# Patient Record
(demographics unavailable — no encounter records)

---

## 2025-01-03 NOTE — PHYSICAL EXAM
[No Acute Distress] : no acute distress [Well Nourished] : well nourished [Well Developed] : well developed [Well-Appearing] : well-appearing [Normal Voice/Communication] : normal voice/communication [Normal Sclera/Conjunctiva] : normal sclera/conjunctiva [PERRL] : pupils equal round and reactive to light [EOMI] : extraocular movements intact [Normal Outer Ear/Nose] : the outer ears and nose were normal in appearance [Normal Oropharynx] : the oropharynx was normal [Normal TMs] : both tympanic membranes were normal [No JVD] : no jugular venous distention [No Lymphadenopathy] : no lymphadenopathy [Supple] : supple [Thyroid Normal, No Nodules] : the thyroid was normal and there were no nodules present [No Respiratory Distress] : no respiratory distress  [No Accessory Muscle Use] : no accessory muscle use [Clear to Auscultation] : lungs were clear to auscultation bilaterally [Normal Rate] : normal rate  [Regular Rhythm] : with a regular rhythm [Normal S1, S2] : normal S1 and S2 [No Murmur] : no murmur heard [No Carotid Bruits] : no carotid bruits [No Abdominal Bruit] : a ~M bruit was not heard ~T in the abdomen [No Varicosities] : no varicosities [Pedal Pulses Present] : the pedal pulses are present [No Edema] : there was no peripheral edema [No Palpable Aorta] : no palpable aorta [No Extremity Clubbing/Cyanosis] : no extremity clubbing/cyanosis [Soft] : abdomen soft [Non Tender] : non-tender [Non-distended] : non-distended [No Masses] : no abdominal mass palpated [No HSM] : no HSM [Normal Bowel Sounds] : normal bowel sounds [No CVA Tenderness] : no CVA  tenderness [No Spinal Tenderness] : no spinal tenderness [No Joint Swelling] : no joint swelling [Grossly Normal Strength/Tone] : grossly normal strength/tone [No Rash] : no rash [Coordination Grossly Intact] : coordination grossly intact [No Focal Deficits] : no focal deficits [Normal Gait] : normal gait [Deep Tendon Reflexes (DTR)] : deep tendon reflexes were 2+ and symmetric [Speech Grossly Normal] : speech grossly normal [Memory Grossly Normal] : memory grossly normal [Normal Affect] : the affect was normal [Alert and Oriented x3] : oriented to person, place, and time [Normal Mood] : the mood was normal [Normal Insight/Judgement] : insight and judgment were intact [Comprehensive Foot Exam Normal] : Right and left foot were examined and both feet are normal. No ulcers in either foot. Toes are normal and with full ROM.  Normal tactile sensation with monofilament testing throughout both feet

## 2025-01-03 NOTE — REVIEW OF SYSTEMS
[Joint Pain] : joint pain [Joint Stiffness] : joint stiffness [Negative] : Heme/Lymph [Muscle Pain] : no muscle pain [Muscle Weakness] : no muscle weakness [Back Pain] : no back pain [Joint Swelling] : no joint swelling [Headache] : no headache [Dizziness] : no dizziness [Fainting] : no fainting [Confusion] : no confusion [Unsteady Walk] : no ataxia [Memory Loss] : no memory loss [FreeTextEntry7] : lump left groin. [FreeTextEntry9] : Back pain resolved postop. [de-identified] : Paresthesia of lower extremities improving postop.

## 2025-01-03 NOTE — ASSESSMENT
[FreeTextEntry1] : .  Assessment and plan:  1.  Lumbar radiculopathy secondary to lumbar stenosis patient recently underwent spinal surgery at [facility] he states that the pain has totally resolved but he does have some residual lower extremity paresthesia other than that he is feeling great postop.  2.  Hypertension well-controlled with present medical management patient will continue metoprolol succinate ER 50 mg p.o. daily and lisinopril 40 mg p.o. daily.  Today's blood pressure stable for patient at 140/78.  3.  Hyperlipidemia continue low-fat low-cholesterol diet and Zetia 10 mg p.o. daily with rosuvastatin 40 mg p.o. daily.  4.  Ischemic heart disease patient will continue aspirin 81 mg p.o. daily.  5.  Hyperglycemia at today's visit comprehensive blood work drawn I will be checking hemoglobin A1c.  Patient in the past was taking glimepiride 4 mg p.o. daily patient's [facility].  6.  Comprehensive blood work drawn in office by examiner    Total time spent face-to-face and non-face-to-face time 35 minutes.

## 2025-01-03 NOTE — HISTORY OF PRESENT ILLNESS
[FreeTextEntry1] : see HPI [de-identified] : Patient is 79-year-old gentleman who presents today for follow-up and disease management patient with lumbar radiculopathy was seen by orthopedic surgery spine specialist Dr. Tj Alvarez and also by pain management patient on his last epidural steroid injection it was successful for pain relief..  History is also significant for hypertension, diabetes mellitus type 2, hyperlipidemia, ischemic heart disease,.  Patient with history of lumbar stenosis with radiculopathy subsequently did undergo surgery at [facility] patient admits to feeling much better.  The procedure was done August 23 at [facility] procedure done by Dr. Yogesh Delgado.

## 2025-01-03 NOTE — HEALTH RISK ASSESSMENT
[Yes] : Yes [2 - 3 times a week (3 pts)] : 2 - 3  times a week (3 points) [1 or 2 (0 pts)] : 1 or 2 (0 points) [Never (0 pts)] : Never (0 points) [No] : In the past 12 months have you used drugs other than those required for medical reasons? No [No falls in past year] : Patient reported no falls in the past year [Little interest or pleasure doing things] : 1) Little interest or pleasure doing things [Feeling down, depressed, or hopeless] : 2) Feeling down, depressed, or hopeless [0] : 2) Feeling down, depressed, or hopeless: Not at all (0) [PHQ-2 Negative - No further assessment needed] : PHQ-2 Negative - No further assessment needed [With Patient/Caregiver] : , with patient/caregiver [Reviewed no changes] : Reviewed, no changes [Designated Healthcare Proxy] : Designated healthcare proxy [Name: ___] : Health Care Proxy's Name: [unfilled]  [Relationship: ___] : Relationship: [unfilled] [Aggressive treatment] : aggressive treatment [I will adhere to the patient's wishes.] : I will adhere to the patient's wishes. [Former] : Former [20 or more] : 20 or more [> 15 Years] : > 15 Years [Audit-CScore] : 3 [[id number]] : 0 [AdvancecareDate] : 01/25

## 2025-04-25 NOTE — ASSESSMENT
[FreeTextEntry1] : .  Assessment and plan:  1.  Lumbar radiculopathy secondary to lumbar stenosis patient recently underwent spinal surgery at [facility] he states that the pain has totally resolved but he does have some residual lower extremity paresthesia other than that he is feeling great postop.  Unfortunately pain has now reverted to the right side not as bad as prior to surgery of the left side but it is present orthopedic evaluation in the near future.  2.  Hypertension well-controlled with present medical management patient will continue metoprolol succinate ER 50 mg p.o. daily and lisinopril 40 mg p.o. daily.  Today's blood pressure stable for patient at 140/78.  3.  Hyperlipidemia continue low-fat low-cholesterol diet and Zetia 10 mg p.o. daily with rosuvastatin 40 mg p.o. daily.  4.  Ischemic heart disease patient will continue aspirin 81 mg p.o. daily.  5.  Hyperglycemia at today's visit comprehensive blood work drawn I will be checking hemoglobin A1c.  Patient in the past was taking glimepiride 4 mg p.o. daily patient's [facility].  6.  Comprehensive blood work drawn in office by examiner    Total time spent face-to-face and non-face-to-face time 35 minutes.

## 2025-04-25 NOTE — REVIEW OF SYSTEMS
[Joint Pain] : joint pain [Joint Stiffness] : joint stiffness [Negative] : Heme/Lymph [Muscle Pain] : no muscle pain [Muscle Weakness] : no muscle weakness [Back Pain] : no back pain [Joint Swelling] : no joint swelling [Headache] : no headache [Dizziness] : no dizziness [Fainting] : no fainting [Confusion] : no confusion [Unsteady Walk] : no ataxia [Memory Loss] : no memory loss [FreeTextEntry7] : lump left groin. [FreeTextEntry9] : Back pain resolved postop. [de-identified] : Paresthesia of lower extremities improving postop.

## 2025-04-25 NOTE — HISTORY OF PRESENT ILLNESS
[FreeTextEntry1] : Follow-up and disease management. [de-identified] : Patient is 79-year-old gentleman who presents today for follow-up and disease management patient with lumbar radiculopathy postop doing well,  hypertension, diabetes mellitus type 2, hyperlipidemia, ischemic heart disease,.  Patient with history of lumbar stenosis with radiculopathy subsequently did undergo surgery at [facility] patient admits to feeling much better.  The procedure was done August 23 at [facility] procedure done by Dr. Yogesh Delgado.
